# Patient Record
Sex: MALE | Race: WHITE
[De-identification: names, ages, dates, MRNs, and addresses within clinical notes are randomized per-mention and may not be internally consistent; named-entity substitution may affect disease eponyms.]

---

## 2019-04-17 ENCOUNTER — HOSPITAL ENCOUNTER (OUTPATIENT)
Dept: HOSPITAL 74 - FASU-ENDO | Age: 69
Discharge: HOME | End: 2019-04-17
Attending: INTERNAL MEDICINE
Payer: COMMERCIAL

## 2019-04-17 VITALS — DIASTOLIC BLOOD PRESSURE: 75 MMHG | HEART RATE: 83 BPM | SYSTOLIC BLOOD PRESSURE: 122 MMHG

## 2019-04-17 VITALS — TEMPERATURE: 97.8 F

## 2019-04-17 VITALS — BODY MASS INDEX: 29.2 KG/M2

## 2019-04-17 DIAGNOSIS — D12.3: ICD-10-CM

## 2019-04-17 DIAGNOSIS — K57.30: ICD-10-CM

## 2019-04-17 DIAGNOSIS — Z86.010: Primary | ICD-10-CM

## 2019-04-17 PROCEDURE — 0DBL8ZX EXCISION OF TRANSVERSE COLON, VIA NATURAL OR ARTIFICIAL OPENING ENDOSCOPIC, DIAGNOSTIC: ICD-10-PCS | Performed by: INTERNAL MEDICINE

## 2019-04-19 NOTE — PATH
Surgical Pathology Report



Patient Name:  PRASHANT PIMENTEL

Accession #:  

[facility]. Rec. #:  [id number]                                                        

   /Age/Gender:  1950 (Age: 68) / M

Account:  [id number]                                                          

             Location: [facility]

Taken:  2019

Received:  2019

Reported:  2019

Physicians:  Madhu Arteaga M.D.

  



Specimen(s) Received

 SPLENIC FLEXURE HOT SNARE POLYPECTOMY 





Clinical History

History of polyps

Postoperative diagnosis: Diverticulosis, polyp







Final Diagnosis

SPLENIC FLEXURE, POLYP, POLYPECTOMY:

TUBULOVILLOUS ADENOMA.





***Electronically Signed***

Delaney Sun M.D.





Gross Description

Received in formalin labeled "polypectomy splenic flexure," is a 1.4 x 1.0 x 0.6

cm tan, polypoid portion of soft tissue. The base is inked blue and the specimen

is bisected and entirely submitted in one cassette.





[facility]

## 2021-07-21 ENCOUNTER — HOSPITAL ENCOUNTER (OUTPATIENT)
Dept: HOSPITAL 74 - FASU-ENDO | Age: 71
Discharge: HOME | End: 2021-07-21
Attending: INTERNAL MEDICINE
Payer: COMMERCIAL

## 2021-07-21 VITALS — SYSTOLIC BLOOD PRESSURE: 114 MMHG | HEART RATE: 71 BPM | DIASTOLIC BLOOD PRESSURE: 62 MMHG

## 2021-07-21 VITALS — BODY MASS INDEX: 28.9 KG/M2

## 2021-07-21 VITALS — TEMPERATURE: 97.8 F

## 2021-07-21 DIAGNOSIS — K57.30: ICD-10-CM

## 2021-07-21 DIAGNOSIS — Z86.010: Primary | ICD-10-CM

## 2021-07-21 PROCEDURE — 0DJD8ZZ INSPECTION OF LOWER INTESTINAL TRACT, VIA NATURAL OR ARTIFICIAL OPENING ENDOSCOPIC: ICD-10-PCS | Performed by: INTERNAL MEDICINE
